# Patient Record
Sex: FEMALE | Race: ASIAN | NOT HISPANIC OR LATINO
[De-identification: names, ages, dates, MRNs, and addresses within clinical notes are randomized per-mention and may not be internally consistent; named-entity substitution may affect disease eponyms.]

---

## 2020-04-28 PROBLEM — Z00.00 ENCOUNTER FOR PREVENTIVE HEALTH EXAMINATION: Status: ACTIVE | Noted: 2020-04-28

## 2020-05-05 ENCOUNTER — APPOINTMENT (OUTPATIENT)
Dept: RHEUMATOLOGY | Facility: CLINIC | Age: 30
End: 2020-05-05
Payer: COMMERCIAL

## 2020-05-05 DIAGNOSIS — R76.8 OTHER SPECIFIED ABNORMAL IMMUNOLOGICAL FINDINGS IN SERUM: ICD-10-CM

## 2020-05-05 PROCEDURE — 99203 OFFICE O/P NEW LOW 30 MIN: CPT | Mod: 95

## 2020-05-05 NOTE — PHYSICAL EXAM
[General Appearance - Alert] : alert [No Focal Deficits] : no focal deficits [General Appearance - In No Acute Distress] : in no acute distress [Oriented To Time, Place, And Person] : oriented to person, place, and time

## 2020-05-06 NOTE — DATA REVIEWED
[FreeTextEntry1] : Blood work done by neurologist Dr Ace 4/17/20 reviewed with patient\par \par TSH normal\par CBC normal\par ESR, CRP normal\par RF normal \par KEL 1:80\par anti-TPO +\par DsDNA normal\par acL IgG, IgM, IgG negative\par B2GP negative\par Phos serine negative\par LA  negative\par

## 2020-05-06 NOTE — HISTORY OF PRESENT ILLNESS
[Home] : at home, [unfilled] , at the time of the visit. [Patient] : the patient [Other Location: e.g. Home (Enter Location, City,State)___] : at [unfilled] [FreeTextEntry1] : 29 yo W, referred for evaluation by neurologist for +KEL.\par \par =2-3 weeks ago around April 14th\par she developed an acute onset of left sided numbness (arm/leg)\par lasted 10-15 minutes arm 45 minutes\par noted to have elevated BP\par she states that the same day she woke up with a left sided headache that morning\par she denies any prior similar episodes, no know direct triggers\par she reports that she had a miscarriage April 1st 2020 and underwent 3 cycles of medical therapy \par She was subsequently evaluated by neurologist\par work up included serologies (reviewed in data section) and noted to have a +KEL, she was advised to see a Rheumatologist immediately.\par Plan for MRI/MRA\par \par =no history of migraines\par \par \par neurologist Dr Db Ruiz\par [facility], [address] [place], [place]  [de-identified] : Rheumatologic ROS:\par - endorses alopecia\par - severe dry eyes after a procedure + dry mouth\par - occasional oral ulcers\par - No Raynaud's \par - No rashes or photosensitivity\par - occasional pain in the fingers and associated with morning stiffness for few minutes \par - No history of blood clots\par - No family history of auto-immune disease  \par

## 2020-05-06 NOTE — CONSULT LETTER
[Dear  ___] : Dear  [unfilled], [Consult Letter:] : I had the pleasure of evaluating your patient, [unfilled]. [Please see my note below.] : Please see my note below. [Sincerely,] : Sincerely, [FreeTextEntry2] : \par \par neurologist Dr Db Ruiz\par [facility], [address] [place], [place]  [FreeTextEntry3] : Marisel Shah MD\par

## 2020-05-06 NOTE — ASSESSMENT
[FreeTextEntry1] : 31 yo W, referred for evaluation of positive KEL done as part of the work up for evaluation of acute onset of left side hemiparesis lasted few minutes.\par The work up was reviewed with patient at length including the significance of the +KEL.\par she is anti-TPO + which is known to cross react with KEL antibodies.\par Her antiphospholipid antibodies are negative.\par At this time, patient doesn't appear to have any specific stigmata of an underlying Rheumatologic disorder, particularly SLE. \par .reviewed with patient the significance of +KEL test and the reported prevalence in healthy population.\par .Will complete the serologic work up today by sending subset serologies\par \par \par Patient aware of my assessment and plan, all questions answered.\par Patient to reach out after completing the blood work.

## 2020-06-02 ENCOUNTER — APPOINTMENT (OUTPATIENT)
Dept: RHEUMATOLOGY | Facility: CLINIC | Age: 30
End: 2020-06-02

## 2020-11-20 ENCOUNTER — TRANSCRIPTION ENCOUNTER (OUTPATIENT)
Age: 30
End: 2020-11-20